# Patient Record
Sex: MALE | Race: WHITE | Employment: OTHER | URBAN - METROPOLITAN AREA
[De-identification: names, ages, dates, MRNs, and addresses within clinical notes are randomized per-mention and may not be internally consistent; named-entity substitution may affect disease eponyms.]

---

## 2018-08-30 ENCOUNTER — HOSPITAL ENCOUNTER (OUTPATIENT)
Dept: LAB | Age: 83
Discharge: HOME | End: 2018-08-30

## 2018-08-31 PROCEDURE — 88305 TISSUE EXAM BY PATHOLOGIST: CPT

## 2018-12-17 ENCOUNTER — HOSPITAL ENCOUNTER (OUTPATIENT)
Dept: SLEEP MEDICINE | Age: 83
Discharge: HOME | End: 2018-12-17
Payer: MEDICARE

## 2018-12-17 PROCEDURE — 95810 POLYSOM 6/> YRS 4/> PARAM: CPT

## 2021-12-16 ENCOUNTER — HOSPITAL ENCOUNTER (OUTPATIENT)
Dept: LAB | Age: 86
Discharge: HOME | End: 2021-12-16

## 2021-12-16 PROCEDURE — 88305 TISSUE EXAM BY PATHOLOGIST: CPT

## 2023-02-09 ENCOUNTER — APPOINTMENT (OUTPATIENT)
Dept: GENERAL RADIOLOGY | Age: 88
End: 2023-02-09
Payer: MEDICARE

## 2023-02-09 ENCOUNTER — HOSPITAL ENCOUNTER (EMERGENCY)
Age: 88
Discharge: HOME | End: 2023-02-09
Attending: EMERGENCY MEDICINE
Payer: MEDICARE

## 2023-02-09 VITALS
RESPIRATION RATE: 16 BRPM | SYSTOLIC BLOOD PRESSURE: 125 MMHG | DIASTOLIC BLOOD PRESSURE: 74 MMHG | OXYGEN SATURATION: 94 % | BODY MASS INDEX: 25.77 KG/M2 | WEIGHT: 180 LBS | TEMPERATURE: 98.1 F | HEART RATE: 82 BPM | HEIGHT: 70 IN

## 2023-02-09 DIAGNOSIS — K59.00 CONSTIPATION, UNSPECIFIED CONSTIPATION TYPE: Primary | ICD-10-CM

## 2023-02-09 PROCEDURE — 99283 EMERGENCY DEPT VISIT LOW MDM: CPT

## 2023-02-09 PROCEDURE — 74018 RADEX ABDOMEN 1 VIEW: CPT

## 2023-02-09 PROCEDURE — 6370000000 HC RX 637 (ALT 250 FOR IP)

## 2023-02-09 RX ORDER — POLYETHYLENE GLYCOL 3350 17 G/17G
17 POWDER, FOR SOLUTION ORAL
Status: COMPLETED | OUTPATIENT
Start: 2023-02-09 | End: 2023-02-09

## 2023-02-09 RX ADMIN — POLYETHYLENE GLYCOL 3350 17 G: 17 POWDER, FOR SOLUTION ORAL at 10:31

## 2023-02-09 ASSESSMENT — LIFESTYLE VARIABLES
HOW OFTEN DO YOU HAVE A DRINK CONTAINING ALCOHOL: NEVER
HOW MANY STANDARD DRINKS CONTAINING ALCOHOL DO YOU HAVE ON A TYPICAL DAY: PATIENT DOES NOT DRINK

## 2023-02-09 ASSESSMENT — PAIN - FUNCTIONAL ASSESSMENT: PAIN_FUNCTIONAL_ASSESSMENT: 0-10

## 2023-02-09 ASSESSMENT — PAIN SCALES - GENERAL: PAINLEVEL_OUTOF10: 2

## 2023-02-09 NOTE — ED TRIAGE NOTES
Pt states he had a regular bowel movement on Tuesday. States he felt constipated so he took no laxatives, hard stools passed this morning. Pt states he still feels constipated and bloated.

## 2023-02-09 NOTE — ED PROVIDER NOTES
[facility] Emergency Department Provider Note                   PCP:                Risa Blackmon DO               Age: 81 y.o. Sex: male       ICD-10-CM    1. Constipation, unspecified constipation type  K59.00           DISPOSITION           Orders Placed This Encounter   Procedures    XR ABDOMEN (KUB) (SINGLE AP VIEW)        Alondra De La Garza Sr is a 81 y.o. male who presents to the Emergency Department with chief complaint of    Chief Complaint   Patient presents with    Constipation      81year-old male presenting to the emergency department with complaint of irregular bowel movements. He states that he had hard stool this morning and his primary care provider sent him here to rule out intestinal obstruction. He is able to consume solids and liquids without vomiting and has had a bowel movement this morning. Denies abdominal pain. The history is provided by the patient. Review of Systems    No past medical history on file. Past Surgical History:   Procedure Laterality Date    HERNIA REPAIR      inguinaal on Rt side    INTRAOCULAR LENS INSERTION      both lens replaced        Family History   Problem Relation Age of Onset    Heart Disease Mother     Cancer Father         liver        Social History     Socioeconomic History    Marital status:    Tobacco Use    Smoking status: Never    Smokeless tobacco: Never   Substance and Sexual Activity    Alcohol use: Yes    Drug use: No         Patient has no allergy information on record.      Previous Medications    IRBESARTAN-HYDROCHLOROTHIAZIDE (AVALIDE) 150-12.5 MG PER TABLET    Take by mouth    LANSOPRAZOLE (PREVACID SOLUTAB) 15 MG DISINTEGRATING TABLET    Take by mouth every morning (before breakfast)    METHYLCELLULOSE (CITRUCEL) ORAL POWDER    Take by mouth    POTASSIUM CITRATE (UROCIT-K) 10 MEQ (1080 MG) EXTENDED RELEASE TABLET    Take by mouth    PRAVASTATIN (PRAVACHOL) 40 MG TABLET    Take 40 mg by mouth        Vitals signs and nursing note reviewed. Patient Vitals for the past 4 hrs:   Temp Pulse Resp BP SpO2   02/09/23 0847 98.1 °F (36.7 °C) 90 15 (!) 148/72 94 %          Physical Exam  Constitutional:       General: He is not in acute distress. Appearance: Normal appearance. He is normal weight. He is not ill-appearing. HENT:      Head: Normocephalic and atraumatic. Eyes:      Extraocular Movements: Extraocular movements intact. Conjunctiva/sclera: Conjunctivae normal.      Pupils: Pupils are equal, round, and reactive to light. Abdominal:      General: Abdomen is flat. There is no distension. Palpations: Abdomen is soft. Tenderness: There is no abdominal tenderness. Neurological:      Mental Status: He is alert. MDM  Number of Diagnoses or Management Options  Constipation, unspecified constipation type  Diagnosis management comments: Abdominal x-ray obtained which indicates normal amount of stool throughout the colon. Physical exam reassuring as there is no distention or tenderness to palpation. MiraLAX dose administered during ED course. Advised patient he is to take MiraLAX on a daily basis until his bowel movements become normal again. Complexity of Problem: 1 self limited or minor problem. (2)    I have conducted an independent ordering and review of X-rays. Patient was discharged risks and benefits of hospitalization were discussed. Amount and/or Complexity of Data Reviewed  Tests in the radiology section of CPT®: ordered and reviewed    Patient Progress  Patient progress: stable      Procedures      Labs Reviewed - No data to display     XR ABDOMEN (KUB) (SINGLE AP VIEW)   Final Result   Unremarkable bowel gas pattern. Normal amount of stool scattered   throughout the colon. Voice dictation software was used during the making of this note. This software is not perfect and grammatical and other typographical errors may be present.   This note has not been completely proofread for errors.      IMANI Harrell  02/09/23 [id number]

## 2023-02-09 NOTE — DISCHARGE INSTRUCTIONS
Your x-ray looks good with no evidence of bowel obstruction. Take MiraLAX on a daily basis until your bowel movements become regular again. Follow-up with your primary care doctor.

## 2024-03-07 ENCOUNTER — HOSPITAL ENCOUNTER (OUTPATIENT)
Dept: PHYSICAL THERAPY | Age: 89
Setting detail: RECURRING SERIES
Discharge: HOME | End: 2024-03-10
Payer: MEDICARE

## 2024-03-07 DIAGNOSIS — M54.59 OTHER LOW BACK PAIN: Primary | ICD-10-CM

## 2024-03-07 DIAGNOSIS — M54.51 VERTEBROGENIC LOW BACK PAIN: ICD-10-CM

## 2024-03-07 DIAGNOSIS — R53.81 DEBILITY: ICD-10-CM

## 2024-03-07 PROCEDURE — 97161 PT EVAL LOW COMPLEX 20 MIN: CPT

## 2024-03-07 PROCEDURE — 97110 THERAPEUTIC EXERCISES: CPT

## 2024-03-07 ASSESSMENT — PAIN SCALES - GENERAL: PAINLEVEL_OUTOF10: 3

## 2024-03-07 NOTE — THERAPY EVALUATION
Abraham Escobedo Sr  : 1935  Primary: Medicare Part A And B (Medicare)  Secondary: [employer] MEDICARE SUPP [facility] @ [address] [place] [address]  Phone: [phone]  Fax: [phone] Plan Frequency: 2-3x week    Plan of Care/Certification Expiration Date: 24        Plan of Care/Certification Expiration Date:  Plan of Care/Certification Expiration Date: 24    Frequency/Duration: Plan Frequency: 2-3x week      Time In/Out:   Time In: 1030  Time Out: 1107      PT Visit Info:         Visit Count:  1                OUTPATIENT PHYSICAL THERAPY:             Initial Assessment 3/7/2024               Episode (Low back pain, core weakness, debility, balance.)         Treatment Diagnosis:     Other low back pain  Vertebrogenic low back pain  Debility  Medical/Referring Diagnosis:    Weakness [R53.1]  Debility [R53.81]  Fracture of L3 vertebra (HCC) [S32.039A]      Referring Physician:  Hari Rodriguez DO MD Orders:  PT Eval and Treat   Return MD Appt:  No future appointments.    Date of Onset:    2023  Allergies:  Patient has no allergy information on record.  Restrictions/Precautions:    None      Medications Last Reviewed:  3/7/2024     SUBJECTIVE   History of Injury/Illness (Reason for Referral):  \"Well, what I did was try to catch the deck of my tractor when it fell and I knew right then I felti t pop in my back and I went to the Dr. And he said it will takes about 2 months to heal .  I have a compression fx L3 that occurred 2023.  It's gotten better and the first month I had to sleep in the recliner and I couldn't lie flat.  If I laid on my back in certain positions pain would go away and I could sleep.  It's gotten better but when I first get into bed it hurts.  Gnerally is goes away lying on my back.    Patient Stated Goal(s):  \"To be able to walk without pain and have better balance.\"  Initial Pain Level:   [Negative] : Heme/Lymph

## 2024-03-07 NOTE — PROGRESS NOTES
and repetitions as indicated.   Date:  3/7/24 Date:   Date:     Activity/Exercise Parameters Parameters Parameters   Sit to stand 10     Standing hip abduction 10x10\"     Marching 10x10\"     Balance      Leg Press      Leg Curl      Rows      NuStep or UBE      TG hip abduction      Pallof                Treatment/Session Summary:    Treatment Assessment:   Overall doing well.  Wants to get stronger to legs and core.    Communication/Consultation:  Therapy Evaluation sent to referring provider  Equipment provided today:  HEP  Recommendations/Intent for next treatment session: Next visit will focus on strength, balance.  .    >Total Treatment Billable Duration:  24 minutes   Time In: 1030  Time Out: 1107    Hanna Leyva, PT         Charge Capture  [facility]  Appt Desk     No future appointments.

## 2024-03-12 ENCOUNTER — HOSPITAL ENCOUNTER (OUTPATIENT)
Dept: PHYSICAL THERAPY | Age: 89
Setting detail: RECURRING SERIES
Discharge: HOME | End: 2024-03-15
Payer: MEDICARE

## 2024-03-12 PROCEDURE — 97110 THERAPEUTIC EXERCISES: CPT

## 2024-03-12 NOTE — PROGRESS NOTES
Required minimal visual, verbal, and manual cues to promote proper body alignment, promote proper body posture, and promote proper body mechanics.  Progressed resistance, range, and repetitions as indicated.   Date:  3/7/24 Date:  3/12/24 Date:     Activity/Exercise Parameters Parameters Parameters   Sit to stand 10 10x    Standing hip abduction 10x10\"     Marching 10x10\" 10x2    Balance  Hallway monster walks    Leg Press      Leg Curl      Rows  Green 10x10\"    NuStep or UBE  10 minutes    TG hip abduction  25# 10x    Pallof                Treatment/Session Summary:    Treatment Assessment:   Overall doing well.  Wants to get stronger to legs and core.  Incorporated posture work today and discussed benefits.  Communication/Consultation:  Therapy Evaluation sent to referring provider  Equipment provided today:  HEP  Recommendations/Intent for next treatment session: Next visit will focus on strength, balance.  .    >Total Treatment Billable Duration:  53  minutes   Time In: 0920  Time Out: 1015    Hanna Leyva PT         Charge Capture  [facility] Portal  Appt Desk     Future Appointments   Date Time Provider Department Center   3/15/2024  8:45 AM Hanna Leyva PT [facility]   3/19/2024  1:00 PM Hanna Leyva PT [facility]   3/21/2024  8:45 AM Hanna Leyva PT [facility]   3/26/2024 10:15 AM Hanna Leyva PT [facility]   3/28/2024  1:45 PM Hanna Leyva PT [facility]

## 2024-03-15 ENCOUNTER — HOSPITAL ENCOUNTER (OUTPATIENT)
Dept: PHYSICAL THERAPY | Age: 89
Setting detail: RECURRING SERIES
Discharge: HOME | End: 2024-03-18
Payer: MEDICARE

## 2024-03-15 PROCEDURE — 97110 THERAPEUTIC EXERCISES: CPT

## 2024-03-15 NOTE — PROGRESS NOTES
Abraham Escobedo Sr  : 1935  Primary: Medicare Part A And B (Medicare)  Secondary: [employer] MEDICARE SUPP [facility] @ [address] [place] [address]  Phone: [phone]  Fax: [phone] Plan Frequency: 2-3x week    Plan of Care/Certification Expiration Date: 24        Plan of Care/Certification Expiration Date:  Plan of Care/Certification Expiration Date: 24    Frequency/Duration:   Plan Frequency: 2-3x week      Time In/Out:   Time In: 0840  Time Out: 09      PT Visit Info:         Visit Count:  3    OUTPATIENT PHYSICAL THERAPY:   Treatment Note 3/15/2024       Episode  (Low back pain, core weakness, debility, balance.)               Treatment Diagnosis:    No data found  Medical/Referring Diagnosis:    Weakness [R53.1]  Debility [R53.81]  Fracture of L3 vertebra (HCC) [S32.039A]      Referring Physician:  Hari Rodriguez DO MD Orders:  PT Eval and Treat   Return MD Appt:    Future Appointments   Date Time Provider Department Center   3/19/2024  1:00 PM Hanna Leyva, PT [facility]   3/21/2024  8:45 AM Hanna Leyva, PT [facility]   3/26/2024 10:15 AM Hanna Leyva, PT [facility]   3/28/2024  1:45 PM Hanna Leyva, PT [facility]      Date of Onset:  No data recorded   Allergies:   Patient has no allergy information on record.  Restrictions/Precautions:   NOTE: HX OF COMPRESSION FX  L3   None      Interventions Planned (Treatment may consist of any combination of the following):     See Assessment Note    Subjective Comments:   I haven't had as much pain as lately.    Initial Pain Level::      /10  Post Session Pain Level:        /10  Medications Last Reviewed:  3/15/2024  Updated Objective Findings:  See Evaluation Note from today  Treatment   THERAPEUTIC EXERCISE: (53 minutes):    Exercises per grid below to improve mobility, strength, and balance.  Required minimal visual, verbal, and manual cues to

## 2024-03-19 ENCOUNTER — HOSPITAL ENCOUNTER (OUTPATIENT)
Dept: PHYSICAL THERAPY | Age: 89
Setting detail: RECURRING SERIES
Discharge: HOME | End: 2024-03-22
Payer: MEDICARE

## 2024-03-19 PROCEDURE — 97110 THERAPEUTIC EXERCISES: CPT

## 2024-03-19 NOTE — PROGRESS NOTES
Abraham Escobedo Sr  : 1935  Primary: Medicare Part A And B (Medicare)  Secondary: [employer] MEDICARE SUPP [facility] @ [address] [place] [address]  Phone: [phone]  Fax: [phone] Plan Frequency: 2-3x week    Plan of Care/Certification Expiration Date: 24        Plan of Care/Certification Expiration Date:  Plan of Care/Certification Expiration Date: 24    Frequency/Duration:   Plan Frequency: 2-3x week      Time In/Out:   Time In: 1248  Time Out: 1345      PT Visit Info:         Visit Count:  4    OUTPATIENT PHYSICAL THERAPY:   Treatment Note 3/19/2024       Episode  (Low back pain, core weakness, debility, balance.)               Treatment Diagnosis:    No data found  Medical/Referring Diagnosis:    Weakness [R53.1]  Debility [R53.81]  Fracture of L3 vertebra (HCC) [S32.039A]      Referring Physician:  Hari Rodriguez DO MD Orders:  PT Eval and Treat   Return MD Appt:    Future Appointments   Date Time Provider Department Center   3/21/2024  8:45 AM Hanna Leyva, PT [facility]   3/26/2024 10:15 AM Hanna Leyva, PT [facility]   3/28/2024  1:45 PM Hanna Leyva, PT [facility]      Date of Onset:  No data recorded   Allergies:   Patient has no allergy information on record.  Restrictions/Precautions:   NOTE: HX OF COMPRESSION FX  L3   None      Interventions Planned (Treatment may consist of any combination of the following):     See Assessment Note    Subjective Comments:   I haven't had as much pain as lately.    Initial Pain Level::      /10  Post Session Pain Level:        /10  Medications Last Reviewed:  3/19/2024  Updated Objective Findings:  See Evaluation Note from today  Treatment   THERAPEUTIC EXERCISE: (53 minutes):    Exercises per grid below to improve mobility, strength, and balance.  Required minimal visual, verbal, and manual cues to promote proper body alignment, promote proper body

## 2024-03-21 ENCOUNTER — HOSPITAL ENCOUNTER (OUTPATIENT)
Dept: PHYSICAL THERAPY | Age: 89
Setting detail: RECURRING SERIES
Discharge: HOME | End: 2024-03-24
Payer: MEDICARE

## 2024-03-21 PROCEDURE — 97110 THERAPEUTIC EXERCISES: CPT

## 2024-03-21 NOTE — PROGRESS NOTES
Abraham Escobedo Sr  : 1935  Primary: Medicare Part A And B (Medicare)  Secondary: [employer] MEDICARE SUPP [facility] @ [address] [place] [address]  Phone: [phone]  Fax: [phone] Plan Frequency: 2-3x week    Plan of Care/Certification Expiration Date: 24        Plan of Care/Certification Expiration Date:  Plan of Care/Certification Expiration Date: 24    Frequency/Duration:   Plan Frequency: 2-3x week      Time In/Out:   Time In: 0848  Time Out: 948      PT Visit Info:         Visit Count:  5    OUTPATIENT PHYSICAL THERAPY:   Treatment Note 3/21/2024       Episode  (Low back pain, core weakness, debility, balance.)               Treatment Diagnosis:    No data found  Medical/Referring Diagnosis:    Weakness [R53.1]  Debility [R53.81]  Fracture of L3 vertebra (HCC) [S32.039A]      Referring Physician:  Hari Rodriguez DO MD Orders:  PT Eval and Treat   Return MD Appt:    Future Appointments   Date Time Provider Department Center   3/26/2024 10:15 AM Hanna Leyva, PT [facility]   3/28/2024  1:45 PM Hanna Leyva PT [facility]      Date of Onset:  No data recorded   Allergies:   Patient has no allergy information on record.  Restrictions/Precautions:   NOTE: HX OF COMPRESSION FX  L3   None      Interventions Planned (Treatment may consist of any combination of the following):     See Assessment Note    Subjective Comments:   I am doing well and my wife reminds me about my posture to stand up tall.   Initial Pain Level::      /0/10  Post Session Pain Level:        0//10  Medications Last Reviewed:  3/21/2024  Updated Objective Findings:  See Evaluation Note from today  Treatment   THERAPEUTIC EXERCISE: (53 minutes):    Exercises per grid below to improve mobility, strength, and balance.  Required minimal visual, verbal, and manual cues to promote proper body alignment, promote proper body posture, and promote

## 2024-03-26 ENCOUNTER — HOSPITAL ENCOUNTER (OUTPATIENT)
Dept: PHYSICAL THERAPY | Age: 89
Setting detail: RECURRING SERIES
Discharge: HOME | End: 2024-03-29
Payer: MEDICARE

## 2024-03-26 PROCEDURE — 97110 THERAPEUTIC EXERCISES: CPT

## 2024-03-26 NOTE — PROGRESS NOTES
Abraham Escobedo Sr  : 1935  Primary: Medicare Part A And B (Medicare)  Secondary: [employer] HE[employer] MEDICARE SUPP [facility] @ [address] [place] [address]  Phone: [phone]  Fax: [phone] Plan Frequency: 2-3x week    Plan of Care/Certification Expiration Date: 24        Plan of Care/Certification Expiration Date:  Plan of Care/Certification Expiration Date: 24    Frequency/Duration:   Plan Frequency: 2-3x week      Time In/Out:   Time In: 1010  Time Out: 1110      PT Visit Info:         Visit Count:  6    OUTPATIENT PHYSICAL THERAPY:   Treatment Note 3/26/2024       Episode  (Low back pain, core weakness, debility, balance.)               Treatment Diagnosis:    No data found  Medical/Referring Diagnosis:    Weakness [R53.1]  Debility [R53.81]  Fracture of L3 vertebra (HCC) [S32.039A]      Referring Physician:  Hari Rodriguez DO MD Orders:  PT Eval and Treat   Return MD Appt:    Future Appointments   Date Time Provider Department Center   3/28/2024  1:45 PM Hanna Leyva, PT [facility]      Date of Onset:  No data recorded   Allergies:   Patient has no allergy information on record.  Restrictions/Precautions:   NOTE: HX OF COMPRESSION FX  L3   None      Interventions Planned (Treatment may consist of any combination of the following):     See Assessment Note    Subjective Comments:   I try and remind myself to have good posture  Initial Pain Level::      /0/10  Post Session Pain Level:        0//10  Medications Last Reviewed:  3/26/2024  Updated Objective Findings:  See Evaluation Note from today  Treatment   THERAPEUTIC EXERCISE: (53 minutes):    Exercises per grid below to improve mobility, strength, and balance.  Required minimal visual, verbal, and manual cues to promote proper body alignment, promote proper body posture, and promote proper body mechanics.  Progressed resistance, range, and repetitions as

## 2024-03-28 ENCOUNTER — HOSPITAL ENCOUNTER (OUTPATIENT)
Dept: PHYSICAL THERAPY | Age: 89
Setting detail: RECURRING SERIES
Discharge: HOME | End: 2024-03-31
Payer: MEDICARE

## 2024-03-28 PROCEDURE — 97110 THERAPEUTIC EXERCISES: CPT

## 2024-03-28 NOTE — THERAPY EVALUATION
Abraham Escobedo Sr  : 1935  Primary: Medicare Part A And B (Medicare)  Secondary: [employer] MEDICARE SUPP [facility] @ [facility]  [address] [place] [address]  Phone: [phone]  Fax: [phone] Plan Frequency: 2-3x week    Plan of Care/Certification Expiration Date: 24        Plan of Care/Certification Expiration Date:  Plan of Care/Certification Expiration Date: 24    Frequency/Duration: Plan Frequency: 2-3x week      Time In/Out:   Time In: 1341  Time Out: 1434      PT Visit Info:         Visit Count:  7                OUTPATIENT PHYSICAL THERAPY:             Discharge Summary 3/28/2024               Episode (Low back pain, core weakness, debility, balance.)         Treatment Diagnosis:     No data found  Medical/Referring Diagnosis:    Weakness [R53.1]  Debility [R53.81]  Fracture of L3 vertebra (HCC) [S32.039A]      Referring Physician:  Hari Rodriguez DO MD Orders:  PT Eval and Treat   Return MD Appt:  No future appointments.    Date of Onset:    2023  Allergies:  Patient has no allergy information on record.  Restrictions/Precautions:    None      Medications Last Reviewed:  3/28/2024           OBJECTIVE     Observation/Postural and Gait Assessment: Slight trunk flexion --poor posture in sitting.    Palpation: Lower back  - note hx of fx L3 compression .  Slight discomfort low paraspinals L/S    AROM:   Lumbar extension: 0°   Lumbar flexion: To knees.  Slow, °   Lumbar left side bend: Limited to side knee °   Lumbar right side bend: Limited to side of leg°     AROM (PROM) Left Right   Hip flexion ° °   Hip extension ° °   Hip external rotation (ER) ° °   Hip internal rotation (IR) ° °   Hip abduction ° °   Hip adduction ° °     Strength:  Manual Muscle Test (out of 5) Left Right   Knee extension 5 5   Knee flexion 5 5   Hip flexion 5 5   Hip extension     Hip abduction     Ankle DF 5 5   Ankle PF 5 5

## 2024-03-28 NOTE — PROGRESS NOTES
Marching    Balance [id number] walks and side to side walks       Leg Press    Leg Curl    Rows bLue  10x10\"   NuStep or UBE 10 minutes level 2   TG hip abduction 25# 3x10   Pallof 10x2           Treatment/Session Summary:    Treatment Assessment:   Overall showing improvement but [place] today.    Communication/Consultation:  Therapy Evaluation sent to referring provider  Equipment provided today:  HEP  Recommendations/Intent for next treatment session: Next visit will focus on strength, balance.  .    >Total Treatment Billable Duration:  53  minutes   Time In: 1341  Time Out: 1434    Hanna eLyva, PT         Charge Capture  [facility] Portal  Appt Desk     No future appointments.

## 2025-04-30 ENCOUNTER — HOSPITAL ENCOUNTER (OUTPATIENT)
Dept: PHYSICAL THERAPY | Age: 89
Setting detail: RECURRING SERIES
Discharge: HOME | End: 2025-05-03
Payer: MEDICARE

## 2025-04-30 DIAGNOSIS — M62.89 POSTURAL FATIGUE: Primary | ICD-10-CM

## 2025-04-30 DIAGNOSIS — R26.2 DIFFICULTY IN WALKING, NOT ELSEWHERE CLASSIFIED: ICD-10-CM

## 2025-04-30 DIAGNOSIS — M54.59 OTHER LOW BACK PAIN: ICD-10-CM

## 2025-04-30 PROCEDURE — 97110 THERAPEUTIC EXERCISES: CPT

## 2025-04-30 PROCEDURE — 97161 PT EVAL LOW COMPLEX 20 MIN: CPT

## 2025-04-30 NOTE — THERAPY EVALUATION
Abraham Escobedo Sr  : 1935  Primary: Medicare Part A And B (Medicare)  Secondary: [employer] HE[employer] MEDICARE SUPP [facility] @ [address] [place] [address]  Phone: [phone]  Fax: [phone] Plan Frequency: 2-3x week  Plan of Care/Certification Expiration Date: 25        Plan of Care/Certification Expiration Date:  Plan of Care/Certification Expiration Date: 25    Frequency/Duration: Plan Frequency: 2-3x week      Time In/Out:   Time In: 1345  Time Out: 1417      PT Visit Info:         Visit Count:  1                OUTPATIENT PHYSICAL THERAPY:             Initial Assessment 2025               Episode (Weakness, low back pain, postural defiicits)         Treatment Diagnosis:     Postural fatigue  Other low back pain  Difficulty in walking, not elsewhere classified  Medical/Referring Diagnosis:    Abnormal posture [R29.3]  Muscle weakness (generalized) [M62.81]      Referring Physician:  Hari Rodriguez DO MD Orders:  PT Eval and Treat   Return MD Appt:    Future Appointments   Date Time Provider Department Center   2025  2:30 PM Hanna Lay W, PTA [facility]   2025  9:30 AM AlirezaHanna cash W, PTA [facility]   2025 11:45 AM HarperHanna W, PTA [facility]   2025  9:30 AM HarperAmayaHanna W, PTA [facility]   2025  2:30 PM AlirezaAmaya cashberly W, PTA [facility]   2025  1:00 PM HarperAmayaHanna W, PTA [facility]         Date of Onset:    Chronic, but exacerbated 1 month ago.    Allergies:  Patient has no allergy information on record.  Restrictions/Precautions:    None      Medications Last Reviewed: 2025     SUBJECTIVE   History of Injury/Illness (Reason for Referral):  When I twist or bend over to pick something up off the floor it hurts and pinches some.  This one is a problem.  This bout of pain started probably between a month or two ago.  I didn't feel it like the last one, but this one I

## 2025-04-30 NOTE — PROGRESS NOTES
Abraham Escobedo Sr  : 1935  Primary: Medicare Part A And B (Medicare)  Secondary: [employer] HE[employer] C[employer] MEDICARE SUPP [facility] @ [address] [place] [address]  Phone: [phone]  Fax: [phone] Plan Frequency: 2-3x week    Plan of Care/Certification Expiration Date: 25        Plan of Care/Certification Expiration Date:  Plan of Care/Certification Expiration Date: 25    Frequency/Duration: Plan Frequency: 2-3x week      Time In/Out:   Time In: 1345  Time Out: 1417      PT Visit Info:         Visit Count:  1    OUTPATIENT PHYSICAL THERAPY:   Treatment Note 2025       Episode  (Weakness, low back pain, postural defiicits)               Treatment Diagnosis:    Postural fatigue  Other low back pain  Difficulty in walking, not elsewhere classified  Medical/Referring Diagnosis:    Abnormal posture [R29.3]  Muscle weakness (generalized) [M62.81]      Referring Physician:  Hari Rodriguez DO MD Orders:  PT Eval and Treat   Return MD Appt:    Future Appointments   Date Time Provider Department Center   2025  2:30 PM AlirezaHanna cash W, PTA [facility]   2025  9:30 AM BrazosLilibethly W, PTA [facility]   2025 11:45 AM AlirezaAmayaHanna W, PTA [facility]   2025  9:30 AM [place], Hanna W, PTA [facility]   2025  2:30 PM BrazosAmayaHanna W, PTA [facility]   2025  1:00 PM [place], Hanna W, PTA [facility]        Date of Onset:  No data recorded   Allergies:   Patient has no allergy information on record.  Restrictions/Precautions:   None      Interventions Planned (Treatment may consist of any combination of the following):     See Assessment Note    Subjective Comments:   I do not have pain all the time, but mainly at different times in the day.  I don't know all the exact motions that hurt but it does hurt when I do bend forward.  Initial Pain Level:     0-3 /10  Post Session Pain Level:      0 /10  Medications Last

## 2025-05-06 ENCOUNTER — HOSPITAL ENCOUNTER (OUTPATIENT)
Dept: PHYSICAL THERAPY | Age: 89
Setting detail: RECURRING SERIES
Discharge: HOME | End: 2025-05-09
Payer: MEDICARE

## 2025-05-06 PROCEDURE — 97110 THERAPEUTIC EXERCISES: CPT

## 2025-05-06 NOTE — PROGRESS NOTES
Abraham Escobedo Sr  : 1935  Primary: Medicare Part A And B (Medicare)  Secondary: [employer] MEDICARE SUPP [facility] @ [address] [place] [address]  Phone: [phone]  Fax: [phone] Plan Frequency: 2-3x week    Plan of Care/Certification Expiration Date: 25        Plan of Care/Certification Expiration Date:  Plan of Care/Certification Expiration Date: 25    Frequency/Duration: Plan Frequency: 2-3x week      Time In/Out:   Time In: 0230  Time Out: 0315      PT Visit Info:         Visit Count:  2    OUTPATIENT PHYSICAL THERAPY:   Treatment Note 2025       Episode  (Weakness, low back pain, postural defiicits)               Treatment Diagnosis:    No data found  Medical/Referring Diagnosis:    Abnormal posture [R29.3]  Muscle weakness (generalized) [M62.81]      Referring Physician:  Hari Rodriguez DO MD Orders:  PT Eval and Treat   Return MD Appt:    Future Appointments   Date Time Provider Department Center   2025  9:30 AM HitterdalHanna W, PTA [facility]   2025 11:45 AM AlirezaAmayaHanna W, PTA [facility]   2025  9:30 AM HitterdalAmayaHanna W, PTA [facility]   2025  2:30 PM AlirezaAmayaHanna W, PTA [facility]   2025  1:00 PM AlirezaAmayaHanna W, PTA [facility]        Date of Onset:  No data recorded   Allergies:   Patient has no allergy information on record.  Restrictions/Precautions:   None      Interventions Planned (Treatment may consist of any combination of the following):     See Assessment Note    Subjective Comments: Patient reports back has been hurting some for last few days.    Initial Pain Level:     0-3 /10  Post Session Pain Level:      0 /10  Medications Last Reviewed: 2025  Updated Objective Findings:  None Today  Treatment   THERAPEUTIC EXERCISE: (45 minutes):    Exercises per grid below to improve mobility, strength, and balance.  Required minimal visual, verbal, and manual cues to

## 2025-05-12 ENCOUNTER — HOSPITAL ENCOUNTER (OUTPATIENT)
Dept: PHYSICAL THERAPY | Age: 89
Setting detail: RECURRING SERIES
Discharge: HOME | End: 2025-05-15
Payer: MEDICARE

## 2025-05-12 PROCEDURE — 97110 THERAPEUTIC EXERCISES: CPT

## 2025-05-12 NOTE — PROGRESS NOTES
Abraham Escobedo Sr  : 1935  Primary: Medicare Part A And B (Medicare)  Secondary: [employer] MEDICARE SUPP [facility] @ [address] [place] [address]  Phone: [phone]  Fax: [phone] Plan Frequency: 2-3x week    Plan of Care/Certification Expiration Date: 25        Plan of Care/Certification Expiration Date:  Plan of Care/Certification Expiration Date: 25    Frequency/Duration: Plan Frequency: 2-3x week      Time In/Out:   Time In: 930  Time Out: 1015      PT Visit Info:         Visit Count:  3    OUTPATIENT PHYSICAL THERAPY:   Treatment Note 2025       Episode  (Weakness, low back pain, postural defiicits)               Treatment Diagnosis:    No data found  Medical/Referring Diagnosis:    Abnormal posture [R29.3]  Muscle weakness (generalized) [M62.81]      Referring Physician:  Hari Rodriguez DO MD Orders:  PT Eval and Treat   Return MD Appt:    Future Appointments   Date Time Provider Department Center   2025 12:00 PM Hanna Lay, PTA [facility]   2025  9:30 AM Hanna Lay, PTA [facility]   2025  2:30 PM Hanan Lay, PTA [facility]   2025  1:00 PM Hanna Lay W, PTA [facility]        Date of Onset:  No data recorded   Allergies:   Patient has no allergy information on record.  Restrictions/Precautions:   None      Interventions Planned (Treatment may consist of any combination of the following):     See Assessment Note    Subjective Comments: Patient reports back is still hurting some. He states he has a really hard time doing the exercises lying down.     Initial Pain Level:     3 /10  Post Session Pain Level:      3 /10  Medications Last Reviewed: 2025  Updated Objective Findings:  None Today  Treatment   THERAPEUTIC EXERCISE: (45 minutes):    Exercises per grid below to improve mobility, strength, and balance.  Required minimal visual, verbal, and manual cues to

## 2025-05-13 ENCOUNTER — HOSPITAL ENCOUNTER (OUTPATIENT)
Dept: PHYSICAL THERAPY | Age: 89
Setting detail: RECURRING SERIES
Discharge: HOME | End: 2025-05-16
Payer: MEDICARE

## 2025-05-13 PROCEDURE — 97110 THERAPEUTIC EXERCISES: CPT

## 2025-05-13 NOTE — PROGRESS NOTES
Abraham Escobedo Sr  : 1935  Primary: Medicare Part A And B (Medicare)  Secondary: [employer] MEDICARE SUPP [facility] @ [address] [place] [address]  Phone: [phone]  Fax: [phone] Plan Frequency: 2-3x week    Plan of Care/Certification Expiration Date: 25        Plan of Care/Certification Expiration Date:  Plan of Care/Certification Expiration Date: 25    Frequency/Duration: Plan Frequency: 2-3x week      Time In/Out:   Time In: 1200  Time Out: 1245      PT Visit Info:         Visit Count:  4    OUTPATIENT PHYSICAL THERAPY:   Treatment Note 2025       Episode  (Weakness, low back pain, postural defiicits)               Treatment Diagnosis:    No data found  Medical/Referring Diagnosis:    Abnormal posture [R29.3]  Muscle weakness (generalized) [M62.81]      Referring Physician:  Hari Rodriguez DO MD Orders:  PT Eval and Treat   Return MD Appt:    Future Appointments   Date Time Provider Department Center   2025  9:30 AM Hanna Lay, PTA [facility]   2025  2:30 PM Hanna Lay, PTA [facility]   2025  1:00 PM Hanna Lay PTA [facility]        Date of Onset:  No data recorded   Allergies:   Patient has no allergy information on record.  Restrictions/Precautions:   None      Interventions Planned (Treatment may consist of any combination of the following):     See Assessment Note    Subjective Comments: Patient reports back feels about the same. He states that he has trouble standing up straight.    Initial Pain Level:     3 /10  Post Session Pain Level:      2 /10  Medications Last Reviewed: 2025  Updated Objective Findings:  None Today  Treatment   THERAPEUTIC EXERCISE: (45 minutes):    Exercises per grid below to improve mobility, strength, and balance.  Required minimal visual, verbal, and manual cues to promote proper body alignment and promote proper body posture.  Progressed

## 2025-05-19 ENCOUNTER — HOSPITAL ENCOUNTER (OUTPATIENT)
Dept: PHYSICAL THERAPY | Age: 89
Setting detail: RECURRING SERIES
Discharge: HOME | End: 2025-05-22
Payer: MEDICARE

## 2025-05-19 PROCEDURE — 97110 THERAPEUTIC EXERCISES: CPT

## 2025-05-19 NOTE — PROGRESS NOTES
Abraham Escobedo Sr  : 1935  Primary: Medicare Part A And B (Medicare)  Secondary: [employer] MEDICARE SUPP [facility] @ [address] [place] [address]  Phone: [phone]  Fax: [phone] Plan Frequency: 2-3x week    Plan of Care/Certification Expiration Date: 25        Plan of Care/Certification Expiration Date:  Plan of Care/Certification Expiration Date: 25    Frequency/Duration: Plan Frequency: 2-3x week      Time In/Out:   Time In: 30  Time Out: 1015      PT Visit Info:         Visit Count:  5    OUTPATIENT PHYSICAL THERAPY:   Treatment Note 2025       Episode  (Weakness, low back pain, postural defiicits)               Treatment Diagnosis:    No data found  Medical/Referring Diagnosis:    Abnormal posture [R29.3]  Muscle weakness (generalized) [M62.81]      Referring Physician:  Hari Rodriguez DO MD Orders:  PT Eval and Treat   Return MD Appt:    Future Appointments   Date Time Provider Department Center   2025  2:30 PM Hanna Lay PTA [facility]   2025  1:00 PM Hanna Lay PTA [facility]        Date of Onset:  No data recorded   Allergies:   Patient has no allergy information on record.  Restrictions/Precautions:   None      Interventions Planned (Treatment may consist of any combination of the following):     See Assessment Note    Subjective Comments: Patient reports back is a little better. He states he is able to walk better.    Initial Pain Level:     3 /10  Post Session Pain Level:      2 /10  Medications Last Reviewed: 2025  Updated Objective Findings:  None Today  Treatment   THERAPEUTIC EXERCISE: (45 minutes):    Exercises per grid below to improve mobility, strength, and balance.  Required minimal visual, verbal, and manual cues to promote proper body alignment and promote proper body posture.  Progressed resistance, range, and repetitions as indicated.   Date:  25

## 2025-05-21 ENCOUNTER — HOSPITAL ENCOUNTER (OUTPATIENT)
Dept: PHYSICAL THERAPY | Age: 89
Setting detail: RECURRING SERIES
Discharge: HOME | End: 2025-05-24
Payer: MEDICARE

## 2025-05-21 PROCEDURE — 97110 THERAPEUTIC EXERCISES: CPT

## 2025-05-21 NOTE — PROGRESS NOTES
Abraham Escobedo Sr  : 1935  Primary: Medicare Part A And B (Medicare)  Secondary: [employer] HE[employer] MEDICARE SUPP [facility] @ [address] [place] [address]  Phone: [phone]  Fax: [phone] Plan Frequency: 2-3x week    Plan of Care/Certification Expiration Date: 25        Plan of Care/Certification Expiration Date:  Plan of Care/Certification Expiration Date: 25    Frequency/Duration: Plan Frequency: 2-3x week      Time In/Out:   Time In: 0230  Time Out: 0315      PT Visit Info:         Visit Count:  6    OUTPATIENT PHYSICAL THERAPY:   Treatment Note 2025       Episode  (Weakness, low back pain, postural defiicits)               Treatment Diagnosis:    No data found  Medical/Referring Diagnosis:    Abnormal posture [R29.3]  Muscle weakness (generalized) [M62.81]      Referring Physician:  Hari Rodriguez DO MD Orders:  PT Eval and Treat   Return MD Appt:    Future Appointments   Date Time Provider Department Center   2025  1:00 PM Hanna Lay, MIR [facility]        Date of Onset:  No data recorded   Allergies:   Patient has no allergy information on record.  Restrictions/Precautions:   None      Interventions Planned (Treatment may consist of any combination of the following):     See Assessment Note    Subjective Comments: Patient reports back is a little better. He states he is able to walk better.    Initial Pain Level:     3 /10  Post Session Pain Level:      2 /10  Medications Last Reviewed: 2025  Updated Objective Findings:  None Today  Treatment   THERAPEUTIC EXERCISE: (45 minutes):    Exercises per grid below to improve mobility, strength, and balance.  Required minimal visual, verbal, and manual cues to promote proper body alignment and promote proper body posture.  Progressed resistance, range, and repetitions as indicated.   Date:  25 Date  25 Date  25   Activity/Exercise Parameters

## 2025-05-27 ENCOUNTER — HOSPITAL ENCOUNTER (OUTPATIENT)
Dept: PHYSICAL THERAPY | Age: 89
Setting detail: RECURRING SERIES
Discharge: HOME | End: 2025-05-30
Payer: MEDICARE

## 2025-05-27 PROCEDURE — 97110 THERAPEUTIC EXERCISES: CPT

## 2025-05-27 NOTE — PROGRESS NOTES
Abraham Escobedo Sr  : 1935  Primary: Medicare Part A And B (Medicare)  Secondary: [employer] MEDICARE SUPP [facility] @ [address] [place] [address]  Phone: [phone]  Fax: [phone] Plan Frequency: 2-3x week    Plan of Care/Certification Expiration Date: 25        Plan of Care/Certification Expiration Date:  Plan of Care/Certification Expiration Date: 25    Frequency/Duration: Plan Frequency: 2-3x week      Time In/Out:   Time In: 0100  Time Out: 0145      PT Visit Info:         Visit Count:  7    OUTPATIENT PHYSICAL THERAPY:   Treatment Note 2025       Episode  (Weakness, low back pain, postural defiicits)               Treatment Diagnosis:    No data found  Medical/Referring Diagnosis:    Abnormal posture [R29.3]  Muscle weakness (generalized) [M62.81]      Referring Physician:  Hari Rodriguez DO MD Orders:  PT Eval and Treat   Return MD Appt:    No future appointments.       Date of Onset:  No data recorded   Allergies:   Patient has no allergy information on record.  Restrictions/Precautions:   None      Interventions Planned (Treatment may consist of any combination of the following):     See Assessment Note    Subjective Comments: Patient reports back has been hurting some, but today it's ok.    Initial Pain Level:     3 /10  Post Session Pain Level:      2 /10  Medications Last Reviewed: 2025  Updated Objective Findings:  None Today  Treatment   THERAPEUTIC EXERCISE: (45 minutes):    Exercises per grid below to improve mobility, strength, and balance.  Required minimal visual, verbal, and manual cues to promote proper body alignment and promote proper body posture.  Progressed resistance, range, and repetitions as indicated.   Date  25 Date  25 Date     Activity/Exercise      NuStep X 12 min X 12 min X 12 min   Marching slow      Bridges      LTR      L Stretch  3 x 30 sec 3 x 30 sec 3 x 30 sec

## 2025-06-23 ENCOUNTER — OFFICE VISIT (OUTPATIENT)
Age: 89
End: 2025-06-23
Payer: MEDICARE

## 2025-06-23 VITALS — WEIGHT: 179.2 LBS | BODY MASS INDEX: 25.65 KG/M2 | HEIGHT: 70 IN

## 2025-06-23 DIAGNOSIS — M54.50 LOW BACK PAIN, UNSPECIFIED BACK PAIN LATERALITY, UNSPECIFIED CHRONICITY, UNSPECIFIED WHETHER SCIATICA PRESENT: Primary | ICD-10-CM

## 2025-06-23 PROCEDURE — 4004F PT TOBACCO SCREEN RCVD TLK: CPT | Performed by: ORTHOPAEDIC SURGERY

## 2025-06-23 PROCEDURE — 1159F MED LIST DOCD IN RCRD: CPT | Performed by: ORTHOPAEDIC SURGERY

## 2025-06-23 PROCEDURE — 99204 OFFICE O/P NEW MOD 45 MIN: CPT | Performed by: ORTHOPAEDIC SURGERY

## 2025-06-23 PROCEDURE — 1123F ACP DISCUSS/DSCN MKR DOCD: CPT | Performed by: ORTHOPAEDIC SURGERY

## 2025-06-23 PROCEDURE — G8427 DOCREV CUR MEDS BY ELIG CLIN: HCPCS | Performed by: ORTHOPAEDIC SURGERY

## 2025-06-23 PROCEDURE — G8419 CALC BMI OUT NRM PARAM NOF/U: HCPCS | Performed by: ORTHOPAEDIC SURGERY

## 2025-06-23 RX ORDER — FINASTERIDE 5 MG/1
TABLET, FILM COATED ORAL
COMMUNITY
Start: 2025-06-04

## 2025-06-23 RX ORDER — PANTOPRAZOLE SODIUM 40 MG/1
40 TABLET, DELAYED RELEASE ORAL DAILY
COMMUNITY
Start: 2025-06-03 | End: 2025-07-03

## 2025-06-23 RX ORDER — CLOBETASOL PROPIONATE 0.5 MG/ML
SOLUTION TOPICAL
COMMUNITY
Start: 2025-05-21

## 2025-06-23 RX ORDER — ASPIRIN 81 MG/1
81 TABLET ORAL DAILY
COMMUNITY
Start: 2025-06-03 | End: 2025-07-03

## 2025-06-23 RX ORDER — LOSARTAN POTASSIUM 50 MG/1
50 TABLET ORAL DAILY
COMMUNITY
Start: 2025-04-15

## 2025-06-23 RX ORDER — IRBESARTAN 150 MG/1
150 TABLET ORAL DAILY
COMMUNITY

## 2025-08-07 ENCOUNTER — HOSPITAL ENCOUNTER (OUTPATIENT)
Dept: PHYSICAL THERAPY | Age: 89
Setting detail: RECURRING SERIES
Discharge: HOME | End: 2025-08-10
Payer: MEDICARE

## 2025-08-07 DIAGNOSIS — M54.59 OTHER LOW BACK PAIN: Primary | ICD-10-CM

## 2025-08-07 DIAGNOSIS — M62.89 POSTURAL FATIGUE: ICD-10-CM

## 2025-08-07 DIAGNOSIS — R53.81 DEBILITY: ICD-10-CM

## 2025-08-07 DIAGNOSIS — M62.81 MUSCLE WEAKNESS (GENERALIZED): ICD-10-CM

## 2025-08-07 DIAGNOSIS — R26.2 DIFFICULTY IN WALKING, NOT ELSEWHERE CLASSIFIED: ICD-10-CM

## 2025-08-07 DIAGNOSIS — M25.69 STIFFNESS OF OTHER SPECIFIED JOINT, NOT ELSEWHERE CLASSIFIED: ICD-10-CM

## 2025-08-07 PROCEDURE — 97161 PT EVAL LOW COMPLEX 20 MIN: CPT

## 2025-08-07 PROCEDURE — 97110 THERAPEUTIC EXERCISES: CPT

## 2025-08-07 ASSESSMENT — PAIN SCALES - GENERAL: PAINLEVEL_OUTOF10: 1

## 2025-08-12 ENCOUNTER — HOSPITAL ENCOUNTER (OUTPATIENT)
Dept: PHYSICAL THERAPY | Age: 89
Setting detail: RECURRING SERIES
Discharge: HOME | End: 2025-08-15
Payer: MEDICARE

## 2025-08-12 PROCEDURE — 97110 THERAPEUTIC EXERCISES: CPT

## 2025-08-20 ENCOUNTER — HOSPITAL ENCOUNTER (OUTPATIENT)
Dept: PHYSICAL THERAPY | Age: 89
Setting detail: RECURRING SERIES
Discharge: HOME | End: 2025-08-23
Payer: MEDICARE

## 2025-08-20 PROCEDURE — 97140 MANUAL THERAPY 1/> REGIONS: CPT

## 2025-08-20 PROCEDURE — 97110 THERAPEUTIC EXERCISES: CPT

## 2025-08-22 ENCOUNTER — HOSPITAL ENCOUNTER (OUTPATIENT)
Dept: PHYSICAL THERAPY | Age: 89
Setting detail: RECURRING SERIES
Discharge: HOME | End: 2025-08-25
Payer: MEDICARE

## 2025-08-22 PROCEDURE — 97110 THERAPEUTIC EXERCISES: CPT

## 2025-08-25 ENCOUNTER — HOSPITAL ENCOUNTER (OUTPATIENT)
Dept: PHYSICAL THERAPY | Age: 89
Setting detail: RECURRING SERIES
Discharge: HOME | End: 2025-08-28
Payer: MEDICARE

## 2025-08-25 PROCEDURE — 97110 THERAPEUTIC EXERCISES: CPT

## 2025-08-29 ENCOUNTER — APPOINTMENT (OUTPATIENT)
Dept: PHYSICAL THERAPY | Age: 89
End: 2025-08-29
Payer: MEDICARE

## 2025-09-02 ENCOUNTER — HOSPITAL ENCOUNTER (INPATIENT)
Age: 89
LOS: 1 days | Discharge: HOME HEALTH | End: 2025-09-05
Attending: HOSPITALIST | Admitting: STUDENT IN AN ORGANIZED HEALTH CARE EDUCATION/TRAINING PROGRAM
Payer: MEDICARE

## 2025-09-02 ENCOUNTER — APPOINTMENT (OUTPATIENT)
Dept: GENERAL RADIOLOGY | Age: 89
End: 2025-09-02
Payer: MEDICARE

## 2025-09-02 DIAGNOSIS — R62.7 FAILURE TO THRIVE IN ADULT: ICD-10-CM

## 2025-09-02 DIAGNOSIS — M54.59 OTHER LOW BACK PAIN: ICD-10-CM

## 2025-09-02 DIAGNOSIS — M54.42 CHRONIC MIDLINE LOW BACK PAIN WITH LEFT-SIDED SCIATICA: ICD-10-CM

## 2025-09-02 DIAGNOSIS — G89.29 CHRONIC MIDLINE LOW BACK PAIN WITH LEFT-SIDED SCIATICA: ICD-10-CM

## 2025-09-02 DIAGNOSIS — R26.2 DIFFICULTY IN WALKING: Primary | ICD-10-CM

## 2025-09-02 DIAGNOSIS — R26.2 UNABLE TO AMBULATE: ICD-10-CM

## 2025-09-02 LAB
ALBUMIN SERPL-MCNC: 4 G/DL (ref 3.2–4.6)
ALBUMIN/GLOB SERPL: 1.2 (ref 1–1.9)
ALP SERPL-CCNC: 175 U/L (ref 40–129)
ALT SERPL-CCNC: 33 U/L (ref 8–55)
ANION GAP SERPL CALC-SCNC: 11 MMOL/L (ref 7–16)
AST SERPL-CCNC: 44 U/L (ref 15–37)
BASOPHILS # BLD: 0.06 K/UL (ref 0–0.2)
BASOPHILS NFR BLD: 0.6 % (ref 0–2)
BILIRUB SERPL-MCNC: 0.6 MG/DL (ref 0–1.2)
BUN SERPL-MCNC: 14 MG/DL (ref 8–23)
CALCIUM SERPL-MCNC: 9.6 MG/DL (ref 8.8–10.2)
CHLORIDE SERPL-SCNC: 100 MMOL/L (ref 98–107)
CO2 SERPL-SCNC: 26 MMOL/L (ref 20–29)
CREAT SERPL-MCNC: 0.79 MG/DL (ref 0.8–1.3)
DIFFERENTIAL METHOD BLD: ABNORMAL
EOSINOPHIL # BLD: 0.06 K/UL (ref 0–0.8)
EOSINOPHIL NFR BLD: 0.6 % (ref 0.5–7.8)
ERYTHROCYTE [DISTWIDTH] IN BLOOD BY AUTOMATED COUNT: 13.4 % (ref 11.9–14.6)
GLOBULIN SER CALC-MCNC: 3.3 G/DL (ref 2.3–3.5)
GLUCOSE SERPL-MCNC: 117 MG/DL (ref 70–99)
HCT VFR BLD AUTO: 40.9 % (ref 41.1–50.3)
HGB BLD-MCNC: 13.5 G/DL (ref 13.6–17.2)
IMM GRANULOCYTES # BLD AUTO: 0.06 K/UL (ref 0–0.5)
IMM GRANULOCYTES NFR BLD AUTO: 0.6 % (ref 0–5)
LYMPHOCYTES # BLD: 1.13 K/UL (ref 0.5–4.6)
LYMPHOCYTES NFR BLD: 10.5 % (ref 13–44)
MCH RBC QN AUTO: 34.4 PG (ref 26.1–32.9)
MCHC RBC AUTO-ENTMCNC: 33 G/DL (ref 31.4–35)
MCV RBC AUTO: 104.3 FL (ref 82–102)
MONOCYTES # BLD: 0.83 K/UL (ref 0.1–1.3)
MONOCYTES NFR BLD: 7.7 % (ref 4–12)
NEUTS SEG # BLD: 8.64 K/UL (ref 1.7–8.2)
NEUTS SEG NFR BLD: 80 % (ref 43–78)
NRBC # BLD: 0 K/UL (ref 0–0.2)
PLATELET # BLD AUTO: 405 K/UL (ref 150–450)
PMV BLD AUTO: 9.7 FL (ref 9.4–12.3)
POTASSIUM SERPL-SCNC: 4.6 MMOL/L (ref 3.5–5.1)
PROT SERPL-MCNC: 7.3 G/DL (ref 6.3–8.2)
RBC # BLD AUTO: 3.92 M/UL (ref 4.23–5.6)
SODIUM SERPL-SCNC: 137 MMOL/L (ref 136–145)
WBC # BLD AUTO: 10.8 K/UL (ref 4.3–11.1)

## 2025-09-02 PROCEDURE — 85025 COMPLETE CBC W/AUTO DIFF WBC: CPT

## 2025-09-02 PROCEDURE — 81003 URINALYSIS AUTO W/O SCOPE: CPT

## 2025-09-02 PROCEDURE — 72100 X-RAY EXAM L-S SPINE 2/3 VWS: CPT

## 2025-09-02 PROCEDURE — 6370000000 HC RX 637 (ALT 250 FOR IP)

## 2025-09-02 PROCEDURE — G0378 HOSPITAL OBSERVATION PER HR: HCPCS

## 2025-09-02 PROCEDURE — 80053 COMPREHEN METABOLIC PANEL: CPT

## 2025-09-02 PROCEDURE — 6370000000 HC RX 637 (ALT 250 FOR IP): Performed by: HOSPITALIST

## 2025-09-02 PROCEDURE — 6360000002 HC RX W HCPCS: Performed by: HOSPITALIST

## 2025-09-02 PROCEDURE — 99285 EMERGENCY DEPT VISIT HI MDM: CPT

## 2025-09-02 RX ORDER — POTASSIUM CHLORIDE 1500 MG/1
40 TABLET, EXTENDED RELEASE ORAL PRN
Status: DISCONTINUED | OUTPATIENT
Start: 2025-09-02 | End: 2025-09-05 | Stop reason: HOSPADM

## 2025-09-02 RX ORDER — ONDANSETRON 2 MG/ML
4 INJECTION INTRAMUSCULAR; INTRAVENOUS EVERY 6 HOURS PRN
Status: DISCONTINUED | OUTPATIENT
Start: 2025-09-02 | End: 2025-09-05 | Stop reason: HOSPADM

## 2025-09-02 RX ORDER — HYDROCODONE BITARTRATE AND ACETAMINOPHEN 5; 325 MG/1; MG/1
1 TABLET ORAL
Refills: 0 | Status: COMPLETED | OUTPATIENT
Start: 2025-09-02 | End: 2025-09-02

## 2025-09-02 RX ORDER — MAGNESIUM SULFATE IN WATER 40 MG/ML
2000 INJECTION, SOLUTION INTRAVENOUS PRN
Status: DISCONTINUED | OUTPATIENT
Start: 2025-09-02 | End: 2025-09-05 | Stop reason: HOSPADM

## 2025-09-02 RX ORDER — LOSARTAN POTASSIUM 50 MG/1
50 TABLET ORAL DAILY
Status: DISCONTINUED | OUTPATIENT
Start: 2025-09-03 | End: 2025-09-05 | Stop reason: HOSPADM

## 2025-09-02 RX ORDER — LIDOCAINE 4 G/G
1 PATCH TOPICAL
Status: COMPLETED | OUTPATIENT
Start: 2025-09-02 | End: 2025-09-03

## 2025-09-02 RX ORDER — POTASSIUM CHLORIDE 7.45 MG/ML
10 INJECTION INTRAVENOUS PRN
Status: DISCONTINUED | OUTPATIENT
Start: 2025-09-02 | End: 2025-09-05 | Stop reason: HOSPADM

## 2025-09-02 RX ORDER — SODIUM CHLORIDE 0.9 % (FLUSH) 0.9 %
5-40 SYRINGE (ML) INJECTION EVERY 12 HOURS SCHEDULED
Status: DISCONTINUED | OUTPATIENT
Start: 2025-09-02 | End: 2025-09-05 | Stop reason: HOSPADM

## 2025-09-02 RX ORDER — ENOXAPARIN SODIUM 100 MG/ML
40 INJECTION SUBCUTANEOUS EVERY 24 HOURS
Status: DISCONTINUED | OUTPATIENT
Start: 2025-09-02 | End: 2025-09-05 | Stop reason: HOSPADM

## 2025-09-02 RX ORDER — MORPHINE SULFATE 2 MG/ML
2 INJECTION, SOLUTION INTRAMUSCULAR; INTRAVENOUS
Status: DISCONTINUED | OUTPATIENT
Start: 2025-09-02 | End: 2025-09-05 | Stop reason: HOSPADM

## 2025-09-02 RX ORDER — PANTOPRAZOLE SODIUM 40 MG/1
40 TABLET, DELAYED RELEASE ORAL DAILY
Status: DISCONTINUED | OUTPATIENT
Start: 2025-09-03 | End: 2025-09-05 | Stop reason: HOSPADM

## 2025-09-02 RX ORDER — ACETAMINOPHEN 650 MG/1
650 SUPPOSITORY RECTAL EVERY 6 HOURS PRN
Status: DISCONTINUED | OUTPATIENT
Start: 2025-09-02 | End: 2025-09-05 | Stop reason: HOSPADM

## 2025-09-02 RX ORDER — ACETAMINOPHEN 325 MG/1
650 TABLET ORAL EVERY 6 HOURS PRN
Status: DISCONTINUED | OUTPATIENT
Start: 2025-09-02 | End: 2025-09-05 | Stop reason: HOSPADM

## 2025-09-02 RX ORDER — SODIUM CHLORIDE 0.9 % (FLUSH) 0.9 %
5-40 SYRINGE (ML) INJECTION PRN
Status: DISCONTINUED | OUTPATIENT
Start: 2025-09-02 | End: 2025-09-05 | Stop reason: HOSPADM

## 2025-09-02 RX ORDER — ONDANSETRON 4 MG/1
4 TABLET, ORALLY DISINTEGRATING ORAL EVERY 8 HOURS PRN
Status: DISCONTINUED | OUTPATIENT
Start: 2025-09-02 | End: 2025-09-05 | Stop reason: HOSPADM

## 2025-09-02 RX ORDER — ASPIRIN 81 MG/1
81 TABLET ORAL DAILY
Status: DISCONTINUED | OUTPATIENT
Start: 2025-09-03 | End: 2025-09-05 | Stop reason: HOSPADM

## 2025-09-02 RX ORDER — SODIUM CHLORIDE 9 MG/ML
INJECTION, SOLUTION INTRAVENOUS PRN
Status: DISCONTINUED | OUTPATIENT
Start: 2025-09-02 | End: 2025-09-05 | Stop reason: HOSPADM

## 2025-09-02 RX ORDER — FINASTERIDE 5 MG/1
5 TABLET, FILM COATED ORAL DAILY
Status: DISCONTINUED | OUTPATIENT
Start: 2025-09-03 | End: 2025-09-05 | Stop reason: HOSPADM

## 2025-09-02 RX ORDER — PRAVASTATIN SODIUM 20 MG
40 TABLET ORAL NIGHTLY
Status: DISCONTINUED | OUTPATIENT
Start: 2025-09-02 | End: 2025-09-05 | Stop reason: HOSPADM

## 2025-09-02 RX ORDER — POLYETHYLENE GLYCOL 3350 17 G/17G
17 POWDER, FOR SOLUTION ORAL DAILY PRN
Status: DISCONTINUED | OUTPATIENT
Start: 2025-09-02 | End: 2025-09-05 | Stop reason: HOSPADM

## 2025-09-02 RX ADMIN — ENOXAPARIN SODIUM 40 MG: 100 INJECTION SUBCUTANEOUS at 21:48

## 2025-09-02 RX ADMIN — HYDROCODONE BITARTRATE AND ACETAMINOPHEN 1 TABLET: 5; 325 TABLET ORAL at 16:22

## 2025-09-02 RX ADMIN — PRAVASTATIN SODIUM 40 MG: 20 TABLET ORAL at 21:48

## 2025-09-02 ASSESSMENT — PAIN DESCRIPTION - DESCRIPTORS: DESCRIPTORS: SHARP

## 2025-09-02 ASSESSMENT — PAIN DESCRIPTION - PAIN TYPE: TYPE: ACUTE PAIN

## 2025-09-02 ASSESSMENT — PAIN SCALES - GENERAL: PAINLEVEL_OUTOF10: 6

## 2025-09-02 ASSESSMENT — PAIN - FUNCTIONAL ASSESSMENT: PAIN_FUNCTIONAL_ASSESSMENT: 0-10

## 2025-09-02 ASSESSMENT — PAIN DESCRIPTION - ORIENTATION: ORIENTATION: LOWER

## 2025-09-02 ASSESSMENT — PAIN DESCRIPTION - LOCATION: LOCATION: BACK

## 2025-09-03 PROCEDURE — 97161 PT EVAL LOW COMPLEX 20 MIN: CPT

## 2025-09-03 PROCEDURE — 6370000000 HC RX 637 (ALT 250 FOR IP): Performed by: HOSPITALIST

## 2025-09-03 PROCEDURE — G0378 HOSPITAL OBSERVATION PER HR: HCPCS

## 2025-09-03 PROCEDURE — 97535 SELF CARE MNGMENT TRAINING: CPT

## 2025-09-03 PROCEDURE — 2500000003 HC RX 250 WO HCPCS: Performed by: HOSPITALIST

## 2025-09-03 PROCEDURE — 6370000000 HC RX 637 (ALT 250 FOR IP): Performed by: STUDENT IN AN ORGANIZED HEALTH CARE EDUCATION/TRAINING PROGRAM

## 2025-09-03 PROCEDURE — 97165 OT EVAL LOW COMPLEX 30 MIN: CPT

## 2025-09-03 PROCEDURE — 97530 THERAPEUTIC ACTIVITIES: CPT

## 2025-09-03 PROCEDURE — 6360000002 HC RX W HCPCS: Performed by: HOSPITALIST

## 2025-09-03 RX ORDER — HYDROCODONE BITARTRATE AND ACETAMINOPHEN 5; 325 MG/1; MG/1
1 TABLET ORAL EVERY 6 HOURS PRN
Refills: 0 | Status: DISCONTINUED | OUTPATIENT
Start: 2025-09-03 | End: 2025-09-05 | Stop reason: HOSPADM

## 2025-09-03 RX ORDER — HYDROCODONE BITARTRATE AND ACETAMINOPHEN 10; 325 MG/1; MG/1
1 TABLET ORAL EVERY 4 HOURS PRN
Refills: 0 | Status: DISCONTINUED | OUTPATIENT
Start: 2025-09-03 | End: 2025-09-05 | Stop reason: HOSPADM

## 2025-09-03 RX ORDER — MULTIVIT-MIN/IRON/FOLIC ACID/K 18-600-40
2000 CAPSULE ORAL DAILY
COMMUNITY

## 2025-09-03 RX ADMIN — PRAVASTATIN SODIUM 40 MG: 20 TABLET ORAL at 20:37

## 2025-09-03 RX ADMIN — SODIUM CHLORIDE, PRESERVATIVE FREE 10 ML: 5 INJECTION INTRAVENOUS at 20:37

## 2025-09-03 RX ADMIN — FINASTERIDE 5 MG: 5 TABLET, FILM COATED ORAL at 08:48

## 2025-09-03 RX ADMIN — LOSARTAN POTASSIUM 50 MG: 50 TABLET, FILM COATED ORAL at 08:48

## 2025-09-03 RX ADMIN — MORPHINE SULFATE 2 MG: 2 INJECTION, SOLUTION INTRAMUSCULAR; INTRAVENOUS at 17:46

## 2025-09-03 RX ADMIN — ENOXAPARIN SODIUM 40 MG: 100 INJECTION SUBCUTANEOUS at 20:37

## 2025-09-03 RX ADMIN — HYDROCODONE BITARTRATE AND ACETAMINOPHEN 1 TABLET: 10; 325 TABLET ORAL at 19:05

## 2025-09-03 RX ADMIN — POLYETHYLENE GLYCOL 3350 17 G: 17 POWDER, FOR SOLUTION ORAL at 09:48

## 2025-09-03 RX ADMIN — ASPIRIN 81 MG: 81 TABLET, COATED ORAL at 08:47

## 2025-09-03 RX ADMIN — PANTOPRAZOLE SODIUM 40 MG: 40 TABLET, DELAYED RELEASE ORAL at 08:48

## 2025-09-03 ASSESSMENT — PAIN DESCRIPTION - LOCATION
LOCATION: BACK;SACRUM
LOCATION: BACK;SACRUM
LOCATION: BACK

## 2025-09-03 ASSESSMENT — PAIN DESCRIPTION - ORIENTATION
ORIENTATION: LEFT
ORIENTATION: LOWER

## 2025-09-03 ASSESSMENT — PAIN DESCRIPTION - DESCRIPTORS
DESCRIPTORS: ACHING
DESCRIPTORS: SHARP;JABBING
DESCRIPTORS: SHARP

## 2025-09-03 ASSESSMENT — PAIN SCALES - GENERAL
PAINLEVEL_OUTOF10: 4
PAINLEVEL_OUTOF10: 5
PAINLEVEL_OUTOF10: 7
PAINLEVEL_OUTOF10: 0
PAINLEVEL_OUTOF10: 7

## 2025-09-03 ASSESSMENT — PAIN - FUNCTIONAL ASSESSMENT
PAIN_FUNCTIONAL_ASSESSMENT: 0-10
PAIN_FUNCTIONAL_ASSESSMENT: 0-10

## 2025-09-03 ASSESSMENT — ENCOUNTER SYMPTOMS: BACK PAIN: 1

## 2025-09-04 LAB
BILIRUB UR QL: NEGATIVE
GLUCOSE UR QL STRIP.AUTO: NEGATIVE MG/DL
KETONES UR-MCNC: NEGATIVE MG/DL
LEUKOCYTE ESTERASE UR QL STRIP: NEGATIVE
NITRITE UR QL: NEGATIVE
PH UR: 6.5 (ref 5–9)
PROT UR QL: 30 MG/DL
RBC # UR STRIP: NEGATIVE
SP GR UR: 1.02 (ref 1–1.02)
UROBILINOGEN UR QL: 0.2 EU/DL (ref 0.2–1)

## 2025-09-04 PROCEDURE — 6370000000 HC RX 637 (ALT 250 FOR IP): Performed by: HOSPITALIST

## 2025-09-04 PROCEDURE — 2500000003 HC RX 250 WO HCPCS: Performed by: HOSPITALIST

## 2025-09-04 PROCEDURE — 6370000000 HC RX 637 (ALT 250 FOR IP): Performed by: STUDENT IN AN ORGANIZED HEALTH CARE EDUCATION/TRAINING PROGRAM

## 2025-09-04 PROCEDURE — 6360000002 HC RX W HCPCS: Performed by: HOSPITALIST

## 2025-09-04 PROCEDURE — G0378 HOSPITAL OBSERVATION PER HR: HCPCS

## 2025-09-04 PROCEDURE — 97530 THERAPEUTIC ACTIVITIES: CPT

## 2025-09-04 RX ADMIN — FINASTERIDE 5 MG: 5 TABLET, FILM COATED ORAL at 08:52

## 2025-09-04 RX ADMIN — ASPIRIN 81 MG: 81 TABLET, COATED ORAL at 08:52

## 2025-09-04 RX ADMIN — SODIUM CHLORIDE, PRESERVATIVE FREE 10 ML: 5 INJECTION INTRAVENOUS at 21:00

## 2025-09-04 RX ADMIN — PRAVASTATIN SODIUM 40 MG: 20 TABLET ORAL at 21:00

## 2025-09-04 RX ADMIN — HYDROCODONE BITARTRATE AND ACETAMINOPHEN 1 TABLET: 10; 325 TABLET ORAL at 20:59

## 2025-09-04 RX ADMIN — HYDROCODONE BITARTRATE AND ACETAMINOPHEN 1 TABLET: 10; 325 TABLET ORAL at 13:34

## 2025-09-04 RX ADMIN — PANTOPRAZOLE SODIUM 40 MG: 40 TABLET, DELAYED RELEASE ORAL at 08:52

## 2025-09-04 RX ADMIN — LOSARTAN POTASSIUM 50 MG: 50 TABLET, FILM COATED ORAL at 08:52

## 2025-09-04 RX ADMIN — POLYETHYLENE GLYCOL 3350 17 G: 17 POWDER, FOR SOLUTION ORAL at 05:28

## 2025-09-04 RX ADMIN — ENOXAPARIN SODIUM 40 MG: 100 INJECTION SUBCUTANEOUS at 20:59

## 2025-09-04 ASSESSMENT — PAIN SCALES - GENERAL
PAINLEVEL_OUTOF10: 0
PAINLEVEL_OUTOF10: 5
PAINLEVEL_OUTOF10: 8
PAINLEVEL_OUTOF10: 0

## 2025-09-04 ASSESSMENT — PAIN DESCRIPTION - LOCATION
LOCATION: BACK
LOCATION: GENERALIZED

## 2025-09-04 ASSESSMENT — PAIN DESCRIPTION - DESCRIPTORS
DESCRIPTORS: SHOOTING
DESCRIPTORS: ACHING

## 2025-09-04 ASSESSMENT — PAIN - FUNCTIONAL ASSESSMENT
PAIN_FUNCTIONAL_ASSESSMENT: 0-10

## 2025-09-05 VITALS
TEMPERATURE: 97.5 F | RESPIRATION RATE: 18 BRPM | WEIGHT: 174 LBS | BODY MASS INDEX: 24.91 KG/M2 | SYSTOLIC BLOOD PRESSURE: 139 MMHG | HEART RATE: 92 BPM | DIASTOLIC BLOOD PRESSURE: 82 MMHG | OXYGEN SATURATION: 93 % | HEIGHT: 70 IN

## 2025-09-05 PROBLEM — G89.29 ACUTE ON CHRONIC LOW BACK PAIN: Status: ACTIVE | Noted: 2025-09-05

## 2025-09-05 PROBLEM — M54.50 ACUTE ON CHRONIC LOW BACK PAIN: Status: ACTIVE | Noted: 2025-09-05

## 2025-09-05 PROCEDURE — G0378 HOSPITAL OBSERVATION PER HR: HCPCS

## 2025-09-05 PROCEDURE — 6370000000 HC RX 637 (ALT 250 FOR IP): Performed by: HOSPITALIST

## 2025-09-05 RX ORDER — HYDROCODONE BITARTRATE AND ACETAMINOPHEN 5; 325 MG/1; MG/1
1 TABLET ORAL DAILY PRN
Qty: 5 TABLET | Refills: 0 | Status: SHIPPED | OUTPATIENT
Start: 2025-09-05 | End: 2025-09-10

## 2025-09-05 RX ADMIN — FINASTERIDE 5 MG: 5 TABLET, FILM COATED ORAL at 08:51

## 2025-09-05 RX ADMIN — LOSARTAN POTASSIUM 50 MG: 50 TABLET, FILM COATED ORAL at 08:51

## 2025-09-05 RX ADMIN — POLYETHYLENE GLYCOL 3350 17 G: 17 POWDER, FOR SOLUTION ORAL at 09:48

## 2025-09-05 RX ADMIN — ASPIRIN 81 MG: 81 TABLET, COATED ORAL at 08:51

## 2025-09-05 RX ADMIN — PANTOPRAZOLE SODIUM 40 MG: 40 TABLET, DELAYED RELEASE ORAL at 08:51
